# Patient Record
Sex: FEMALE | URBAN - METROPOLITAN AREA
[De-identification: names, ages, dates, MRNs, and addresses within clinical notes are randomized per-mention and may not be internally consistent; named-entity substitution may affect disease eponyms.]

---

## 2023-01-01 ENCOUNTER — TELEPHONE (OUTPATIENT)
Dept: UROLOGY | Facility: CLINIC | Age: 73
End: 2023-01-01
Payer: COMMERCIAL

## 2023-01-01 ENCOUNTER — TRANSCRIBE ORDERS (OUTPATIENT)
Dept: OTHER | Age: 73
End: 2023-01-01

## 2023-01-01 ENCOUNTER — MEDICAL CORRESPONDENCE (OUTPATIENT)
Dept: HEALTH INFORMATION MANAGEMENT | Facility: CLINIC | Age: 73
End: 2023-01-01
Payer: COMMERCIAL

## 2023-01-01 ENCOUNTER — PRE VISIT (OUTPATIENT)
Dept: UROLOGY | Facility: CLINIC | Age: 73
End: 2023-01-01

## 2023-01-01 DIAGNOSIS — N28.89 LEFT KIDNEY MASS: Primary | ICD-10-CM

## 2023-02-07 NOTE — TELEPHONE ENCOUNTER
Action 2023 JTV 3:56 PM    Action Taken CSS sent an urgent request to [facility] for images to be pushed.      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    REFERRAL INFORMATION:    Referring provider:  Caren Flowers MD @ [facility] contact number:  Phone: [phone]    RECORDS REQUESTED FOR VISIT                                                     NOTES  STATUS/DETAILS   OFFICE NOTE from referring provider  yes, 2023 --  Caren Flowers MD @ [facility]   MEDICATION LIST  yes   LABS     URINALYSIS (UA)  no   URINE CYTOLOGY  no   KIDNEY CANCER     IMAGES PENDING yes, 2023 -- MR LUMBAR  2023 -- XR LUMBAR SPINE     PRE-VISIT CHECKLIST      Record collection complete If no, please explain pending   Appointment appropriately scheduled           (right time/right provider) Yes   Joint diagnostic appointment coordinated correctly          (ensure right order & amount of time) Yes   MyChart activation No   Questionnaire complete If no, please explain pending

## 2023-02-07 NOTE — TELEPHONE ENCOUNTER
[facility] Call Center    Phone Message    May a detailed message be left on voicemail: yes     Reason for Call: Appointment Intake    Referring Provider Name: LionelCaren  Diagnosis and/or Symptoms: left kidney mass    Patient is being referred for the above. Urgent appointment needed in 3-5 days. Writer unable to schedule within requested timeframe. Sending encounter per guidelines. Please review and follow-up with patient for scheduling.     Action Taken: Message routed to:  [facility] ([facility] Urology    Travel Screening: Not Applicable